# Patient Record
Sex: FEMALE | Race: BLACK OR AFRICAN AMERICAN | NOT HISPANIC OR LATINO | URBAN - METROPOLITAN AREA
[De-identification: names, ages, dates, MRNs, and addresses within clinical notes are randomized per-mention and may not be internally consistent; named-entity substitution may affect disease eponyms.]

---

## 2022-02-21 ENCOUNTER — APPOINTMENT (RX ONLY)
Dept: URBAN - METROPOLITAN AREA CLINIC 34 | Facility: CLINIC | Age: 62
Setting detail: DERMATOLOGY
End: 2022-02-21

## 2022-02-21 DIAGNOSIS — L40.0 PSORIASIS VULGARIS: ICD-10-CM | Status: WORSENING

## 2022-02-21 PROCEDURE — ? PRESCRIPTION

## 2022-02-21 PROCEDURE — ? PRESCRIPTION MEDICATION MANAGEMENT

## 2022-02-21 PROCEDURE — ? COUNSELING

## 2022-02-21 PROCEDURE — ? ORDER TESTS

## 2022-02-21 PROCEDURE — 99204 OFFICE O/P NEW MOD 45 MIN: CPT

## 2022-02-21 PROCEDURE — ? ADDITIONAL NOTES

## 2022-02-21 RX ORDER — TRIAMCINOLONE ACETONIDE 1 MG/G
OINTMENT TOPICAL BID
Qty: 454 | Refills: 1 | Status: ERX | COMMUNITY
Start: 2022-02-21

## 2022-02-21 RX ORDER — CLOBETASOL PROPIONATE 0.5 MG/G
OINTMENT TOPICAL BID
Qty: 60 | Refills: 2 | Status: ERX | COMMUNITY
Start: 2022-02-21

## 2022-02-21 RX ADMIN — CLOBETASOL PROPIONATE: 0.5 OINTMENT TOPICAL at 00:00

## 2022-02-21 RX ADMIN — TRIAMCINOLONE ACETONIDE: 1 OINTMENT TOPICAL at 00:00

## 2022-02-21 ASSESSMENT — LOCATION DETAILED DESCRIPTION DERM
LOCATION DETAILED: LEFT DISTAL POSTERIOR UPPER ARM
LOCATION DETAILED: LEFT LATERAL ABDOMEN
LOCATION DETAILED: RIGHT LATERAL ABDOMEN
LOCATION DETAILED: LEFT BUTTOCK
LOCATION DETAILED: RIGHT ANTERIOR PROXIMAL THIGH
LOCATION DETAILED: RIGHT BUTTOCK
LOCATION DETAILED: LEFT INFERIOR LATERAL MIDBACK
LOCATION DETAILED: LEFT ANTERIOR DISTAL THIGH
LOCATION DETAILED: RIGHT DISTAL POSTERIOR UPPER ARM
LOCATION DETAILED: RIGHT SUPERIOR LATERAL LOWER BACK

## 2022-02-21 ASSESSMENT — PGA PSORIASIS: PGA PSORIASIS 2020: SEVERE

## 2022-02-21 ASSESSMENT — LOCATION SIMPLE DESCRIPTION DERM
LOCATION SIMPLE: LEFT THIGH
LOCATION SIMPLE: RIGHT THIGH
LOCATION SIMPLE: RIGHT LOWER BACK
LOCATION SIMPLE: LEFT LOWER BACK
LOCATION SIMPLE: ABDOMEN
LOCATION SIMPLE: RIGHT BUTTOCK
LOCATION SIMPLE: RIGHT POSTERIOR UPPER ARM
LOCATION SIMPLE: LEFT BUTTOCK
LOCATION SIMPLE: LEFT POSTERIOR UPPER ARM

## 2022-02-21 ASSESSMENT — BSA PSORIASIS: % BODY COVERED IN PSORIASIS: 25

## 2022-02-21 ASSESSMENT — ITCH NUMERIC RATING SCALE: HOW SEVERE IS YOUR ITCHING?: 6

## 2022-02-21 ASSESSMENT — LOCATION ZONE DERM
LOCATION ZONE: TRUNK
LOCATION ZONE: LEG
LOCATION ZONE: ARM

## 2022-02-21 NOTE — HPI: RASH
What Type Of Note Output Would You Prefer (Optional)?: Standard Output
Is The Patient Presenting As Previously Scheduled?: Yes
How Severe Is Your Rash?: moderate
Is This A New Presentation, Or A Follow-Up?: Rash
Additional History: Six months

## 2022-02-21 NOTE — PROCEDURE: ORDER TESTS
Expected Date Of Service: 02/21/2022
Bill For Surgical Tray: no
Performing Laboratory: [id number]
Billing Type: Third-Party Bill

## 2022-02-21 NOTE — PROCEDURE: ADDITIONAL NOTES
Detail Level: Simple
Render Risk Assessment In Note?: no
Additional Notes: Patient states that she had a biopsy(20+) years ago to confirm diagnosis. Has tried Clobetasol ointment in the past, which was not effective. Patient has been flaring on and off for about a year.\\n\\nStarted flaring once her cardiologist put her on plavix. Previous dermatologist . \\nHas tried phototherapy in the past, which was somewhat effective. Discuss biologics as treatment. 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 Advised patient stress can also contribute to flares.

## 2022-02-21 NOTE — PROCEDURE: PRESCRIPTION MEDICATION MANAGEMENT
Plan: Start prior authorization process for biologics once labs are resulted. Will try to get tremfya injections approved.
Detail Level: Zone
Render In Strict Bullet Format?: No
Initiate Treatment: Triamcinlone 0.1% BID x two weeks, then as needed\\nClobetasol 0.05% BID x two weeks, then as needed

## 2022-04-04 ENCOUNTER — APPOINTMENT (RX ONLY)
Dept: URBAN - METROPOLITAN AREA CLINIC 34 | Facility: CLINIC | Age: 62
Setting detail: DERMATOLOGY
End: 2022-04-04

## 2022-04-04 DIAGNOSIS — L40.0 PSORIASIS VULGARIS: ICD-10-CM | Status: INADEQUATELY CONTROLLED

## 2022-04-04 PROCEDURE — ? PRESCRIPTION MEDICATION MANAGEMENT

## 2022-04-04 PROCEDURE — 99214 OFFICE O/P EST MOD 30 MIN: CPT

## 2022-04-04 PROCEDURE — ? ADDITIONAL NOTES

## 2022-04-04 PROCEDURE — ? COUNSELING

## 2022-04-04 ASSESSMENT — LOCATION DETAILED DESCRIPTION DERM
LOCATION DETAILED: LEFT INFERIOR LATERAL MIDBACK
LOCATION DETAILED: LEFT BUTTOCK
LOCATION DETAILED: RIGHT LATERAL ABDOMEN
LOCATION DETAILED: LEFT ANTERIOR DISTAL THIGH
LOCATION DETAILED: RIGHT DISTAL POSTERIOR UPPER ARM
LOCATION DETAILED: RIGHT BUTTOCK
LOCATION DETAILED: LEFT DISTAL POSTERIOR UPPER ARM
LOCATION DETAILED: RIGHT SUPERIOR LATERAL LOWER BACK
LOCATION DETAILED: LEFT LATERAL ABDOMEN
LOCATION DETAILED: RIGHT ANTERIOR PROXIMAL THIGH

## 2022-04-04 ASSESSMENT — LOCATION SIMPLE DESCRIPTION DERM
LOCATION SIMPLE: ABDOMEN
LOCATION SIMPLE: RIGHT BUTTOCK
LOCATION SIMPLE: LEFT LOWER BACK
LOCATION SIMPLE: RIGHT POSTERIOR UPPER ARM
LOCATION SIMPLE: RIGHT THIGH
LOCATION SIMPLE: LEFT BUTTOCK
LOCATION SIMPLE: LEFT THIGH
LOCATION SIMPLE: LEFT POSTERIOR UPPER ARM
LOCATION SIMPLE: RIGHT LOWER BACK

## 2022-04-04 ASSESSMENT — LOCATION ZONE DERM
LOCATION ZONE: LEG
LOCATION ZONE: ARM
LOCATION ZONE: TRUNK

## 2022-04-04 NOTE — PROCEDURE: PRESCRIPTION MEDICATION MANAGEMENT
Continue Regimen: Triamcinlone 0.1% BID x two weeks, then as needed\\nClobetasol 0.05% BID x two weeks, then as needed
Plan: Start prior authorization process for biologics once labs are resulted. Will try to get tremfya injections approved.\\n\\nShe obtained biologic labs but they were not sent to us.  She is having them forwarded.  \\n\\nWill start PA for Tremfya. \\n\\nShe did notice improvement with topicals but her involvement is still extensive and requires systemic tx. \\n\\nHas a healing ulcer and wound vac for her distal right lower extremity.
Detail Level: Zone
Render In Strict Bullet Format?: No

## 2022-04-04 NOTE — PROCEDURE: ADDITIONAL NOTES
Detail Level: Simple
Render Risk Assessment In Note?: no
Additional Notes: Patient to continue with topicals. Pending blood work from patients PCP. \\n\\nPatient states that she had a biopsy(20+) years ago to confirm diagnosis.\\n\\nStarted flaring once her cardiologist put her on plavix. Previous dermatologist . \\nHas tried phototherapy in the past, which was somewhat effective. Discuss biologics as treatment. 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 Advised patient stress can also contribute to flares.

## 2025-02-17 ENCOUNTER — INJECTION ONLY (OUTPATIENT)
Dept: URBAN - METROPOLITAN AREA CLINIC 113 | Facility: CLINIC | Age: 65
End: 2025-02-17

## 2025-02-17 DIAGNOSIS — E11.3512: ICD-10-CM

## 2025-02-17 DIAGNOSIS — E11.3311: ICD-10-CM

## 2025-02-17 PROCEDURE — 92134 CPTRZ OPH DX IMG PST SGM RTA: CPT

## 2025-02-17 PROCEDURE — 67028 INJECTION EYE DRUG: CPT | Mod: 50

## 2025-02-17 ASSESSMENT — VISUAL ACUITY
OS_PH: 20/40-1
OD_CC: 20/80-2
OS_CC: 20/50-2

## 2025-02-17 ASSESSMENT — TONOMETRY
OS_IOP_MMHG: 14
OD_IOP_MMHG: 13

## 2025-03-27 ENCOUNTER — FOLLOW UP (OUTPATIENT)
Dept: URBAN - METROPOLITAN AREA CLINIC 113 | Facility: CLINIC | Age: 65
End: 2025-03-27

## 2025-03-27 DIAGNOSIS — E11.3311: ICD-10-CM

## 2025-03-27 DIAGNOSIS — E11.3512: ICD-10-CM

## 2025-03-27 DIAGNOSIS — H43.813: ICD-10-CM

## 2025-03-27 DIAGNOSIS — Q14.1: ICD-10-CM

## 2025-03-27 PROCEDURE — 67028 INJECTION EYE DRUG: CPT | Mod: 50

## 2025-03-27 PROCEDURE — 92014 COMPRE OPH EXAM EST PT 1/>: CPT | Mod: 25

## 2025-03-27 PROCEDURE — 92137 CPTRZ OPH IMG PST SG RTA OCT: CPT

## 2025-03-27 ASSESSMENT — TONOMETRY
OS_IOP_MMHG: 16
OD_IOP_MMHG: 15

## 2025-03-27 ASSESSMENT — VISUAL ACUITY
OS_PH: 20/40
OD_CC: 20/60
OS_CC: 20/40+2

## 2025-05-08 ENCOUNTER — FOLLOW UP (OUTPATIENT)
Dept: URBAN - METROPOLITAN AREA CLINIC 113 | Facility: CLINIC | Age: 65
End: 2025-05-08

## 2025-05-08 DIAGNOSIS — E11.3311: ICD-10-CM

## 2025-05-08 DIAGNOSIS — E11.3512: ICD-10-CM

## 2025-05-08 DIAGNOSIS — Q14.1: ICD-10-CM

## 2025-05-08 DIAGNOSIS — H43.813: ICD-10-CM

## 2025-05-08 PROCEDURE — 92137 CPTRZ OPH IMG PST SG RTA OCT: CPT

## 2025-05-08 PROCEDURE — 67028 INJECTION EYE DRUG: CPT | Mod: 50

## 2025-05-08 PROCEDURE — 92014 COMPRE OPH EXAM EST PT 1/>: CPT | Mod: 25

## 2025-05-08 ASSESSMENT — VISUAL ACUITY
OS_CC: 20/50
OD_CC: 20/70

## 2025-05-08 ASSESSMENT — TONOMETRY
OD_IOP_MMHG: 18
OS_IOP_MMHG: 19

## 2025-06-19 ENCOUNTER — FOLLOW UP (OUTPATIENT)
Dept: URBAN - METROPOLITAN AREA CLINIC 113 | Facility: CLINIC | Age: 65
End: 2025-06-19

## 2025-06-19 DIAGNOSIS — Q14.1: ICD-10-CM

## 2025-06-19 DIAGNOSIS — E11.3512: ICD-10-CM

## 2025-06-19 DIAGNOSIS — H43.813: ICD-10-CM

## 2025-06-19 DIAGNOSIS — E11.3311: ICD-10-CM

## 2025-06-19 PROCEDURE — 92014 COMPRE OPH EXAM EST PT 1/>: CPT

## 2025-06-19 PROCEDURE — 92202 OPSCPY EXTND ON/MAC DRAW: CPT

## 2025-06-19 PROCEDURE — 92134 CPTRZ OPH DX IMG PST SGM RTA: CPT

## 2025-06-19 ASSESSMENT — VISUAL ACUITY
OD_SC: 20/70-2
OS_SC: 20/50-2

## 2025-06-19 ASSESSMENT — TONOMETRY
OD_IOP_MMHG: 14
OS_IOP_MMHG: 16

## 2025-07-17 ENCOUNTER — INJECTION ONLY (OUTPATIENT)
Dept: URBAN - METROPOLITAN AREA CLINIC 113 | Facility: CLINIC | Age: 65
End: 2025-07-17

## 2025-07-17 DIAGNOSIS — E11.3311: ICD-10-CM

## 2025-07-17 DIAGNOSIS — E11.3512: ICD-10-CM

## 2025-07-17 PROCEDURE — 67028 INJECTION EYE DRUG: CPT | Mod: 50

## 2025-07-17 ASSESSMENT — TONOMETRY
OD_IOP_MMHG: 16
OS_IOP_MMHG: 14

## 2025-07-17 ASSESSMENT — VISUAL ACUITY
OS_CC: 20/50
OD_CC: 20/60

## 2025-08-28 ENCOUNTER — FOLLOW UP (OUTPATIENT)
Dept: URBAN - METROPOLITAN AREA CLINIC 113 | Facility: CLINIC | Age: 65
End: 2025-08-28

## 2025-08-28 DIAGNOSIS — Q14.1: ICD-10-CM

## 2025-08-28 DIAGNOSIS — H35.033: ICD-10-CM

## 2025-08-28 DIAGNOSIS — E11.3512: ICD-10-CM

## 2025-08-28 DIAGNOSIS — E11.3311: ICD-10-CM

## 2025-08-28 DIAGNOSIS — H43.813: ICD-10-CM

## 2025-08-28 PROCEDURE — 67028 INJECTION EYE DRUG: CPT | Mod: 50

## 2025-08-28 PROCEDURE — 92137 CPTRZ OPH IMG PST SG RTA OCT: CPT

## 2025-08-28 PROCEDURE — 92014 COMPRE OPH EXAM EST PT 1/>: CPT | Mod: 25

## 2025-08-28 ASSESSMENT — VISUAL ACUITY
OD_CC: 20/80
OS_CC: 20/50

## 2025-08-28 ASSESSMENT — TONOMETRY
OS_IOP_MMHG: 17
OD_IOP_MMHG: 18